# Patient Record
Sex: FEMALE | Race: WHITE
[De-identification: names, ages, dates, MRNs, and addresses within clinical notes are randomized per-mention and may not be internally consistent; named-entity substitution may affect disease eponyms.]

---

## 2017-08-20 ENCOUNTER — HOSPITAL ENCOUNTER (EMERGENCY)
Dept: HOSPITAL 59 - ER | Age: 42
Discharge: HOME | End: 2017-08-20
Payer: MEDICAID

## 2017-08-20 DIAGNOSIS — M77.12: Primary | ICD-10-CM

## 2017-08-20 PROCEDURE — 96372 THER/PROPH/DIAG INJ SC/IM: CPT

## 2017-08-20 PROCEDURE — 99283 EMERGENCY DEPT VISIT LOW MDM: CPT

## 2017-08-20 NOTE — EMERGENCY DEPARTMENT RECORD
History of Present Illness





- General


Chief complaint: Extremity Problem


Stated complaint: LEFT ARM PAIN


Time Seen by Provider: 08/20/17 18:45


Source: Patient





- History of Present Illness


Initial comments: 


The patient states that she has had pain in her elbow region of her arm on and 

off for 3-4 months. It worsens when she tries to  heavy things with her 

left arm. she denies direct injury, but lifting against weight worsens her 

pain.  She has tried excedrin for pain without relief. She was seen at another 

facility yesterday, got an xray, but left after waiting for 3 hours to be seen 

by a Dr. 








- Related Data


 Previous Rx's











 Medication  Instructions  Recorded


 


Ibuprofen [Motrin] 800 mg PO Q8H #30 tab 08/20/17











 Allergies











Allergy/AdvReac Type Severity Reaction Status Date / Time


 


No Known Drug Allergies Allergy   Verified 08/20/17 18:40














Review of Systems


Reviewed: No additional complaints except as noted below


Constitutional: Reports: As per HPI.  Denies: Chills, Fever, Malaise, Night 

sweats, Weakness, Weight change


Eyes: Reports: As per HPI.  Denies: Eye discharge, Eye pain, Photophobia, 

Vision change


ENT: Reports: As per HPI.  Denies: Congestion, Dental pain, Ear pain, Epistaxis

, Hearing loss, Throat pain


Respiratory: Reports: As per HPI.  Denies: Cough, Dyspnea, Hemoptysis, Stridor, 

Wheezes


Cardiovascular: Reports: As per HPI.  Denies: Arrhythmia, Chest pain, Dyspnea 

on exertion, Edema, Murmurs, Orthopnea, Palpitations, Paroxysmal nocturnal 

dyspnea, Rheumatic Fever, Syncope


Endocrine: Reports: As per HPI.  Denies: Fatigue, Heat or cold intolerance, 

Polydipsia, Polyuria


Gastrointestinal: Reports: As per HPI.  Denies: Abdominal pain, Constipation, 

Diarrhea, Hematemesis, Hematochezia, Melena, Nausea, Vomiting


Genitourinary: Reports: As per HPI.  Denies: Abnormal menses, Discharge, 

Dyspareunia, Dysuria, Frequency, Hematuria, Incontinence, Retention, Urgency


Musculoskeletal: Reports: As per HPI.  Denies: Arthralgia, Back pain, Gout, 

Joint swelling, Myalgia, Neck pain


Skin: Reports: As per HPI.  Denies: Bruising, Change in color, Change in hair/

nails, Lesions, Pruritus, Rash


Neurological: Reports: As per HPI.  Denies: Abnormal gait, Confusion, Headache, 

Numbness, Paresthesias, Seizure, Tingling, Tremors, Vertigo, Weakness


Psychiatric: Reports: As per HPI.  Denies: Anxiety, Auditory hallucinations, 

Depression, Homicidal thoughts, Suicidal thoughts, Visual hallucinations


Hematological/Lymphatic: Reports: As per HPI.  Denies: Anemia, Blood Clots, 

Easy bleeding, Easy bruising, Swollen glands





Physical Exam





- General


General Appearance: Alert, Oriented x3, Cooperative, No acute distress





- Head


Head exam: Normal inspection





- Eye


Eye exam: Normal appearance, PERRL


Pupils: Normal accommodation





- ENT


ENT exam: Normal exam, Mucous membranes moist, Normal external ear exam, Normal 

orophraynx, TM's normal bilaterally


Ear exam: Normal external inspection.  negative: External canal tenderness


Nasal Exam: Normal inspection.  negative: Discharge, Sinus tenderness


Mouth exam: Normal external inspection, Tongue normal


Teeth exam: Normal inspection.  negative: Dental caries


Throat exam: Normal inspection.  negative: Tonsillar erythema, Tonsillar exudate





- Neck


Neck exam: Normal inspection, Full ROM.  negative: Tenderness





- Respiratory


Respiratory exam: Normal lung sounds bilaterally.  negative: Respiratory 

distress





- Cardiovascular


Cardiovascular Exam: Regular rate, Normal rhythm, Normal heart sounds





- GI/Abdominal


GI/Abdominal exam: Soft, Normal bowel sounds.  negative: Tenderness





- Rectal


Rectal exam: Deferred





- 


 exam: Deferred





- Extremities


Extremities exam: Normal inspection, Full ROM, Normal capillary refill, 

Tenderness (no point tenderness or bony tenderness. Painful when flexed against 

resistance over lateral epicondyl region of left elbow; CMs intact distally.)





- Back


Back exam: Reports: Normal inspection, Full ROM.  Denies: Muscle spasm, Rash 

noted, Tenderness





- Neurological


Neurological exam: Alert, Normal gait, Oriented X3, Reflexes normal





- Psychiatric


Psychiatric exam: Normal affect, Normal mood





- Skin


Skin exam: Dry, Intact, Normal color, Warm





Medical Decision Making





- Management Options


MDM Management: No Additional Work-up Planned





Disposition


Disposition: Discharge


Clinical Impression: 


 Tendinitis of left elbow





Disposition: Home, Self-Care


Condition: (1) Good


Instructions:  Tennis Elbow (ED), Tendinitis (ED)


Additional Instructions: 


Ace wrap to left elbow.


NO lifting with left arm for 1-2 weeks.


Motrin 800 three times daily with food/antacid.


Call PCP for recheck in7-10 days.


Prescriptions: 


Ibuprofen [Motrin] 800 mg PO Q8H #30 tab


Forms:  Patient Portal Access





Quality





- Quality Measures


Quality Measures: N/A





- Blood Pressure Screening


Does Patient Have Any of the Following: No


Blood Pressure Classification: Hypertensive Reading


Systolic Measurement: 140


Diastolic Measurement: 97


Screening for High Blood Pressure: < Pre-Hypertensive BP, F/U Documented > [

]


Pre-Hypertensive Follow-up Interventions: Follow-up with rescreen every year.

## 2018-09-19 ENCOUNTER — HOSPITAL ENCOUNTER (EMERGENCY)
Dept: HOSPITAL 59 - ER | Age: 43
Discharge: HOME | End: 2018-09-19
Payer: MEDICAID

## 2018-09-19 DIAGNOSIS — M25.571: Primary | ICD-10-CM

## 2018-09-19 DIAGNOSIS — F17.210: ICD-10-CM

## 2018-09-19 PROCEDURE — 99283 EMERGENCY DEPT VISIT LOW MDM: CPT

## 2018-09-19 NOTE — EMERGENCY DEPARTMENT RECORD
History of Present Illness





- General


Chief complaint: Lower Extremity Pain


Stated complaint: rt ankle pain


Time Seen by Provider: 09/19/18 12:57


Source: Patient


Mode of Arrival: Ambulatory


Limitations: No limitations





- History of Present Illness


Initial comments: 


The patient is here due to R ankle pain for about 3 days. She denies any known 

recent injury or trauma. She did sprain the ankle a couple of years ago and it 

has hurt off and on since. There has been no fever, chills, or rashes. The 

patient did go to [facility] last evening for it and did get an xray but due to the 

wait being to long she left before she was given the results.





MD Complaint: Extremity pain


Onset/Timing: 3


-: Days(s)


Location: Right, Ankle


History of Same: Yes


Radiation: Distal


Severity scale (1-10): 6


Quality: Aching


Consistency: Constant


Improves with: Nothing


Worsens with: Nothing


Associated Symptoms: Denies other symptoms





- Related Data


 Previous Rx's











 Medication  Instructions  Recorded


 


Naproxen [Naprosyn] 500 mg PO BID #14 tablet. 09/19/18











 Allergies











Allergy/AdvReac Type Severity Reaction Status Date / Time


 


No Known Drug Allergies Allergy   Verified 09/19/18 12:59














Travel Screening





- Travel/Exposure Within Last 30 Days


Have you traveled within the last 30 days?: No





Review of Systems


Constitutional: Denies: Chills, Fever


Eyes: Denies: Eye discharge


ENT: Denies: Congestion


Respiratory: Denies: Cough, Dyspnea





Past Medical History





- SOCIAL HISTORY


Smoking Status: Current every day smoker


Alcohol Use: None


Drug Use: None





- RESPIRATORY


Hx Respiratory Disorders: No





- CARDIOVASCULAR


Hx Cardio Disorders: No





- NEURO


Hx Neuro Disorders: No





- GI


Hx GI Disorders: No





- 


Hx Genitourinary Disorders: No





- ENDOCRINE


Hx Endocrine Disorders: No





- MUSCULOSKELETAL


Hx Musculoskeletal Disorders: No





- PSYCH


Hx Psych Problems: No





- HEMATOLOGY/ONCOLOGY


Hx Hematology/Oncology Disorders: No





Family Medical History


Any Significant Family History?: Yes





Physical Exam





- General


General Appearance: Alert, Cooperative, No acute distress





- Head


Head exam: Atraumatic, Normocephalic





- Eye


Eye exam: Normal appearance, PERRL





- Extremities


Extremities exam: Full ROM, Normal capillary refill, Tenderness, Other (There 

is no R ankle ligamentous laxity. DP pulses are 2+ and equal bilaterally.).  

negative: Normal inspection (There is very mild edema to the distal R fibular 

area with mild tenderness. There is no warmth, erythema, or abrasions present. )

, Joint swelling





Course





 Vital Signs











  09/19/18





  12:54


 


Temperature 97.7 F


 


Pulse Rate 74


 


Respiratory 20





Rate 


 


Blood Pressure 153/105


 


Pulse Ox 99














- Reevaluation(s)


Reevaluation #1: 


The xray from last evening was reviewed and was neg for fx or dislocation. 


09/19/18 13:13





Reevaluation #2: 


I did discuss the neg xrays with the patient. She is to ice and elevate the R 

ankle and use the brace for 5 days. She is to be off work for 3 days and see 

her PCP if not better on Monday.


09/19/18 13:14








Medical Decision Making





- Data Complexity


MDM Data: X-Ray Ordered and/or Reviewed





- Radiology Data


Radiology results: Report reviewed (R Ankle: Neg for fx or dislocation from [facility].

)





Disposition


Disposition: Discharge


Clinical Impression: 


Ankle pain, right


Qualifiers:


 Chronicity: acute Qualified Code(s): M25.571 - Pain in right ankle and joints 

of right foot





Disposition: Home, Self-Care


Condition: (2) Stable


Instructions:  Arthralgia (ED)


Additional Instructions: 


Please ice and elevate the R ankle for the next 2 days and wear the splint for 

5 days. Off work 3 days. Please use Naprosyn for pain and please see your 

family doctor if not better by Monday. She also is to have her BP rechecked in 

the PCP's office.


Prescriptions: 


Naproxen [Naprosyn] 500 mg PO BID #14 tablet.dr


Forms:  Patient Portal Access


Time of Disposition: 13:17





Quality





- Quality Measures


Quality Measures: N/A





- Blood Pressure Screening


View Details: Yes


Does Patient Have Any of the Following: No


Blood Pressure Classification: Hypertensive Reading


Systolic Measurement: 153


Diastolic Measurement: 105


Screening for High Blood Pressure: < First Hypertensive BP, F/U Documented > [

]


First Hypertensive Follow-up Interventions: Referral to alternative/primary 

care provider.